# Patient Record
Sex: MALE | Race: BLACK OR AFRICAN AMERICAN | URBAN - METROPOLITAN AREA
[De-identification: names, ages, dates, MRNs, and addresses within clinical notes are randomized per-mention and may not be internally consistent; named-entity substitution may affect disease eponyms.]

---

## 2017-02-22 ENCOUNTER — APPOINTMENT (RX ONLY)
Dept: URBAN - METROPOLITAN AREA CLINIC 293 | Facility: CLINIC | Age: 72
Setting detail: DERMATOLOGY
End: 2017-02-22

## 2017-02-22 VITALS — WEIGHT: 164 LBS | HEIGHT: 65.5 IN

## 2017-02-22 DIAGNOSIS — L74.51 PRIMARY FOCAL HYPERHIDROSIS: ICD-10-CM

## 2017-02-22 PROBLEM — E03.9 HYPOTHYROIDISM, UNSPECIFIED: Status: ACTIVE | Noted: 2017-02-22

## 2017-02-22 PROBLEM — E78.5 HYPERLIPIDEMIA, UNSPECIFIED: Status: ACTIVE | Noted: 2017-02-22

## 2017-02-22 PROBLEM — L74.512 PRIMARY FOCAL HYPERHIDROSIS, PALMS: Status: ACTIVE | Noted: 2017-02-22

## 2017-02-22 PROBLEM — L85.3 XEROSIS CUTIS: Status: ACTIVE | Noted: 2017-02-22

## 2017-02-22 PROBLEM — F41.9 ANXIETY DISORDER, UNSPECIFIED: Status: ACTIVE | Noted: 2017-02-22

## 2017-02-22 PROBLEM — L20.84 INTRINSIC (ALLERGIC) ECZEMA: Status: ACTIVE | Noted: 2017-02-22

## 2017-02-22 PROCEDURE — ? PATIENT SPECIFIC COUNSELING

## 2017-02-22 PROCEDURE — 99202 OFFICE O/P NEW SF 15 MIN: CPT

## 2017-02-22 PROCEDURE — ? COUNSELING

## 2017-02-22 ASSESSMENT — LOCATION ZONE DERM: LOCATION ZONE: HAND

## 2017-02-22 ASSESSMENT — LOCATION SIMPLE DESCRIPTION DERM
LOCATION SIMPLE: RIGHT HAND
LOCATION SIMPLE: LEFT HAND

## 2017-02-22 ASSESSMENT — LOCATION DETAILED DESCRIPTION DERM
LOCATION DETAILED: RIGHT RADIAL PALM
LOCATION DETAILED: LEFT THENAR EMINENCE

## 2017-02-22 NOTE — HPI: RASH
How Severe Is Your Rash?: moderate
Is This A New Presentation, Or A Follow-Up?: Rash
Additional History: Patient states that he has had sweaty hands for the majority of his adult life. Seems to be more severe as of recently.

## 2017-02-22 NOTE — PROCEDURE: PATIENT SPECIFIC COUNSELING
Detail Level: Detailed
Patient has tried at home drysol  with no response. Discussed about glycopyrate and Botox injections. Will get approval through [facility] before sending in script.  Will have [facility] to call Dr Rangel once approved then can send in three months supply

## 2017-02-22 NOTE — PROCEDURE: MIPS QUALITY
Quality 226: Preventive Care And Screening: Tobacco Use: Screening And Cessation Intervention: Patient screened for tobacco and never smoked
Quality 110: Preventive Care And Screening: Influenza Immunization: Influenza Immunization Administered during Influenza season
Quality 431: Preventive Care And Screening: Unhealthy Alcohol Use - Screening: Patient screened for unhealthy alcohol use using a single question and scores less than 2 times per year
Detail Level: Detailed
Quality 130: Documentation Of Current Medications In The Medical Record: Current Medications Documented
Quality 111:Pneumonia Vaccination Status For Older Adults: Pneumococcal Vaccination Previously Received
Quality 128: Preventive Care And Screening: Body Mass Index (Bmi) Screening And Follow-Up Plan: BMI is documented within normal parameters and no follow-up plan is required.

## 2017-02-24 ENCOUNTER — RX ONLY (OUTPATIENT)
Age: 72
Setting detail: RX ONLY
End: 2017-02-24

## 2017-02-24 RX ORDER — GLYCOPYRROLATE 1 MG/1
TABLET ORAL
Qty: 30 | Refills: 2 | Status: ERX | COMMUNITY
Start: 2017-02-24

## 2017-03-02 ENCOUNTER — RX ONLY (OUTPATIENT)
Age: 72
Setting detail: RX ONLY
End: 2017-03-02

## 2017-03-02 RX ORDER — GLYCOPYRROLATE 1 MG/1
1 TABLET TABLET ORAL
Qty: 30 | Refills: 2 | Status: ERX

## 2017-03-14 ENCOUNTER — RX ONLY (OUTPATIENT)
Age: 72
Setting detail: RX ONLY
End: 2017-03-14

## 2017-03-14 RX ORDER — ALUMINUM CHLORIDE 20 %
1 APPLICATION SOLUTION, NON-ORAL TOPICAL
Qty: 1 | Refills: 4 | Status: ERX | COMMUNITY
Start: 2017-03-14